# Patient Record
Sex: MALE
[De-identification: names, ages, dates, MRNs, and addresses within clinical notes are randomized per-mention and may not be internally consistent; named-entity substitution may affect disease eponyms.]

---

## 2019-01-08 PROBLEM — Z00.00 ENCOUNTER FOR PREVENTIVE HEALTH EXAMINATION: Status: ACTIVE | Noted: 2019-01-08

## 2019-01-22 ENCOUNTER — APPOINTMENT (OUTPATIENT)
Dept: SURGERY | Facility: CLINIC | Age: 24
End: 2019-01-22
Payer: COMMERCIAL

## 2019-01-22 VITALS
OXYGEN SATURATION: 96 % | HEART RATE: 103 BPM | SYSTOLIC BLOOD PRESSURE: 115 MMHG | BODY MASS INDEX: 30.58 KG/M2 | HEIGHT: 66 IN | TEMPERATURE: 97.4 F | DIASTOLIC BLOOD PRESSURE: 74 MMHG | WEIGHT: 190.25 LBS

## 2019-01-22 DIAGNOSIS — L72.0 EPIDERMAL CYST: ICD-10-CM

## 2019-01-22 PROCEDURE — 99204 OFFICE O/P NEW MOD 45 MIN: CPT

## 2019-01-22 NOTE — HISTORY OF PRESENT ILLNESS
[de-identified] : 22 y/o M here for initial evaluation of mutiple cysts to his upper extremities. Patient reports cysts to his left hand (1st finger), right hand (5th finger) and right forearm. Patient reports that he has had these cysts for the last ten years. He denies any pain to these.  No fevers or chills. No current medications. \par \par

## 2019-01-22 NOTE — END OF VISIT
[FreeTextEntry3] : Documented by Christopher Myrick acting as a scribe for Dr. Stephen Collazo on 1/22/2019

## 2019-01-22 NOTE — PHYSICAL EXAM
[Normal] : affect appropriate [de-identified] : non tender cyst to right forearm, right 5th finger, and left 1st finger  without erythema or edema

## 2019-01-22 NOTE — ASSESSMENT
[FreeTextEntry1] : 24 y/o M with cysts to right forearm and b/l hands. Recommend removal of cyst to right forearm. Risk, benefits, and alternatives to the proposed procedure were discussed with the patient and all questions were answered to their satisfaction.\par Will schedule this procedure. Will refer patient to hand specialist for removal of other cysts.

## 2019-03-19 ENCOUNTER — LABORATORY RESULT (OUTPATIENT)
Age: 24
End: 2019-03-19

## 2019-03-20 ENCOUNTER — APPOINTMENT (OUTPATIENT)
Dept: SURGERY | Facility: CLINIC | Age: 24
End: 2019-03-20

## 2019-03-20 ENCOUNTER — RESULT REVIEW (OUTPATIENT)
Age: 24
End: 2019-03-20

## 2019-03-20 ENCOUNTER — APPOINTMENT (OUTPATIENT)
Dept: BARIATRICS | Facility: CLINIC | Age: 24
End: 2019-03-20
Payer: COMMERCIAL

## 2019-03-20 ENCOUNTER — OUTPATIENT (OUTPATIENT)
Dept: OUTPATIENT SERVICES | Facility: HOSPITAL | Age: 24
LOS: 1 days | Discharge: HOME | End: 2019-03-20

## 2019-03-20 PROCEDURE — 25076 EXC FOREARM TUM DEEP < 3 CM: CPT

## 2019-03-20 PROCEDURE — 21931 EXC BACK LES SC 3 CM/>: CPT

## 2019-03-27 LAB — SURGICAL PATHOLOGY STUDY: SIGNIFICANT CHANGE UP
